# Patient Record
Sex: FEMALE | Race: WHITE
[De-identification: names, ages, dates, MRNs, and addresses within clinical notes are randomized per-mention and may not be internally consistent; named-entity substitution may affect disease eponyms.]

---

## 2019-07-10 ENCOUNTER — HOSPITAL ENCOUNTER (INPATIENT)
Dept: HOSPITAL 12 - ER | Age: 79
LOS: 8 days | Discharge: NURSING, REHAB OR LONG-TERM CARE | DRG: 885 | End: 2019-07-18
Payer: MEDICARE

## 2019-07-10 VITALS — SYSTOLIC BLOOD PRESSURE: 147 MMHG | DIASTOLIC BLOOD PRESSURE: 48 MMHG

## 2019-07-10 VITALS — SYSTOLIC BLOOD PRESSURE: 147 MMHG | DIASTOLIC BLOOD PRESSURE: 56 MMHG

## 2019-07-10 VITALS — HEIGHT: 64 IN | BODY MASS INDEX: 21.85 KG/M2 | WEIGHT: 128 LBS

## 2019-07-10 DIAGNOSIS — E11.9: ICD-10-CM

## 2019-07-10 DIAGNOSIS — Z79.4: ICD-10-CM

## 2019-07-10 DIAGNOSIS — E03.9: ICD-10-CM

## 2019-07-10 DIAGNOSIS — F33.2: Primary | ICD-10-CM

## 2019-07-10 DIAGNOSIS — L97.919: ICD-10-CM

## 2019-07-10 DIAGNOSIS — J69.0: ICD-10-CM

## 2019-07-10 DIAGNOSIS — Z91.5: ICD-10-CM

## 2019-07-10 DIAGNOSIS — T42.4X2D: ICD-10-CM

## 2019-07-10 DIAGNOSIS — F41.9: ICD-10-CM

## 2019-07-10 DIAGNOSIS — I67.2: ICD-10-CM

## 2019-07-10 DIAGNOSIS — T40.2X2D: ICD-10-CM

## 2019-07-10 DIAGNOSIS — I83.009: ICD-10-CM

## 2019-07-10 DIAGNOSIS — N39.0: ICD-10-CM

## 2019-07-10 DIAGNOSIS — L97.929: ICD-10-CM

## 2019-07-10 DIAGNOSIS — E78.5: ICD-10-CM

## 2019-07-10 DIAGNOSIS — I10: ICD-10-CM

## 2019-07-10 PROCEDURE — A4663 DIALYSIS BLOOD PRESSURE CUFF: HCPCS

## 2019-07-10 RX ADMIN — FAMOTIDINE SCH MG: 20 TABLET, FILM COATED ORAL at 21:06

## 2019-07-10 RX ADMIN — Medication SCH EACH: at 17:24

## 2019-07-10 RX ADMIN — Medication SCH EACH: at 21:07

## 2019-07-10 RX ADMIN — LISINOPRIL SCH MG: 20 TABLET ORAL at 21:00

## 2019-07-10 RX ADMIN — Medication SCH MG: at 21:00
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
## 2019-07-10 NOTE — NUR
V/S WERE TAKEN B/P 140/52, PULSE 62; RESPIRATION 18, SPO2 95%. PATIENT A/O, NO CHANGES IN 
LOC. HOWEVER; SHE REFUSED METOPROLOL AND LISINOPRIL QHS. WILL CONTINUE TO MONITOR.

## 2019-07-11 VITALS — SYSTOLIC BLOOD PRESSURE: 144 MMHG | DIASTOLIC BLOOD PRESSURE: 47 MMHG

## 2019-07-11 VITALS — DIASTOLIC BLOOD PRESSURE: 60 MMHG | SYSTOLIC BLOOD PRESSURE: 157 MMHG

## 2019-07-11 VITALS — DIASTOLIC BLOOD PRESSURE: 42 MMHG | SYSTOLIC BLOOD PRESSURE: 145 MMHG

## 2019-07-11 LAB
ALP SERPL-CCNC: 61 U/L (ref 50–136)
ALT SERPL W/O P-5'-P-CCNC: 48 U/L (ref 14–59)
AST SERPL-CCNC: 25 U/L (ref 15–37)
BILIRUB SERPL-MCNC: 0.7 MG/DL (ref 0.2–1)
BUN SERPL-MCNC: 27 MG/DL (ref 7–18)
CHLORIDE SERPL-SCNC: 106 MMOL/L (ref 98–107)
CHOLEST SERPL-MCNC: 146 MG/DL (ref ?–200)
CO2 SERPL-SCNC: 28 MMOL/L (ref 21–32)
CREAT SERPL-MCNC: 0.9 MG/DL (ref 0.6–1.3)
GLUCOSE SERPL-MCNC: 121 MG/DL (ref 74–106)
HDLC SERPL-MCNC: 50 MG/DL (ref 40–60)
POTASSIUM SERPL-SCNC: 3.6 MMOL/L (ref 3.5–5.1)
TRIGL SERPL-MCNC: 131 MG/DL (ref 30–150)
WS STN SPEC: 7 G/DL (ref 6.4–8.2)

## 2019-07-11 RX ADMIN — Medication SCH MG: at 08:29

## 2019-07-11 RX ADMIN — AMLODIPINE BESYLATE SCH MG: 10 TABLET ORAL at 08:30

## 2019-07-11 RX ADMIN — FOLIC ACID SCH MG: 1 TABLET ORAL at 08:31

## 2019-07-11 RX ADMIN — Medication SCH MG: at 08:31

## 2019-07-11 RX ADMIN — LISINOPRIL SCH MG: 20 TABLET ORAL at 20:32

## 2019-07-11 RX ADMIN — Medication SCH MG: at 20:32

## 2019-07-11 RX ADMIN — Medication SCH EACH: at 20:31

## 2019-07-11 RX ADMIN — Medication SCH EACH: at 11:39

## 2019-07-11 RX ADMIN — FAMOTIDINE SCH MG: 20 TABLET, FILM COATED ORAL at 20:31

## 2019-07-11 RX ADMIN — Medication SCH EACH: at 16:53

## 2019-07-11 RX ADMIN — Medication SCH EACH: at 06:51

## 2019-07-11 RX ADMIN — FAMOTIDINE SCH MG: 20 TABLET, FILM COATED ORAL at 08:29

## 2019-07-11 RX ADMIN — LEVOTHYROXINE SODIUM SCH MCG: 100 TABLET ORAL at 06:32

## 2019-07-11 RX ADMIN — ESCITALOPRAM OXALATE SCH MG: 10 TABLET, FILM COATED ORAL at 08:30

## 2019-07-11 RX ADMIN — LISINOPRIL SCH MG: 20 TABLET ORAL at 08:30

## 2019-07-11 RX ADMIN — TEMAZEPAM PRN MG: 7.5 CAPSULE ORAL at 23:26

## 2019-07-11 NOTE — NUR
IT AS NOTED A BUMP OF APPROX 6CM X 6CM ON PATIENT'S RIGHT SIDE OF HER LOWER BACK. NO 
SWELLING, REDNESS NOTED, SKIN IS INTACT. IT APPEARS TO BE SOME TYPE OF IMPLANT. WILL CHECKS 
MEDICAL RECORDS. WILL CONTINUE TO MONITOR.
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
## 2019-07-11 NOTE — NUR
Pt received this morning, AOx1, to name only, denies pain, SI/HI at this time, and is able 
to CFS. Pt is calm and compliant, confused continuing to reference being in a plane. 
Re-orientation provided. Sister came to visit and discussed d/c f/u plans with social 
worker. All safety and comfort precautions in place. Will continue to monitor.

## 2019-07-12 VITALS — SYSTOLIC BLOOD PRESSURE: 131 MMHG | DIASTOLIC BLOOD PRESSURE: 50 MMHG

## 2019-07-12 VITALS — SYSTOLIC BLOOD PRESSURE: 130 MMHG | DIASTOLIC BLOOD PRESSURE: 69 MMHG

## 2019-07-12 VITALS — SYSTOLIC BLOOD PRESSURE: 124 MMHG | DIASTOLIC BLOOD PRESSURE: 51 MMHG

## 2019-07-12 LAB
BASOPHILS # BLD AUTO: 0.1 K/UL (ref 0–8)
BASOPHILS NFR BLD AUTO: 0.7 % (ref 0–2)
BUN SERPL-MCNC: 22 MG/DL (ref 7–18)
CHLORIDE SERPL-SCNC: 107 MMOL/L (ref 98–107)
CO2 SERPL-SCNC: 27 MMOL/L (ref 21–32)
CREAT SERPL-MCNC: 0.8 MG/DL (ref 0.6–1.3)
EOSINOPHIL # BLD AUTO: 0.1 K/UL (ref 0–0.7)
EOSINOPHIL NFR BLD AUTO: 1.1 % (ref 0–7)
GLUCOSE SERPL-MCNC: 111 MG/DL (ref 74–106)
HCT VFR BLD AUTO: 37.4 % (ref 31.2–41.9)
HGB BLD-MCNC: 12.5 G/DL (ref 10.9–14.3)
LYMPHOCYTES # BLD AUTO: 1.4 K/UL (ref 20–40)
LYMPHOCYTES NFR BLD AUTO: 18.3 % (ref 20.5–51.5)
MCH RBC QN AUTO: 30.6 UUG (ref 24.7–32.8)
MCHC RBC AUTO-ENTMCNC: 33 G/DL (ref 32.3–35.6)
MCV RBC AUTO: 91.9 FL (ref 75.5–95.3)
MONOCYTES # BLD AUTO: 1 K/UL (ref 2–10)
MONOCYTES NFR BLD AUTO: 12.7 % (ref 0–11)
NEUTROPHILS # BLD AUTO: 5.2 K/UL (ref 1.8–8.9)
NEUTROPHILS NFR BLD AUTO: 67.2 % (ref 38.5–71.5)
PLATELET # BLD AUTO: 283 K/UL (ref 179–408)
POTASSIUM SERPL-SCNC: 3.1 MMOL/L (ref 3.5–5.1)
RBC # BLD AUTO: 4.07 MIL/UL (ref 3.63–4.92)
TSH SERPL DL<=0.005 MIU/L-ACNC: 1.31 MIU/ML (ref 0.36–3.74)
WBC # BLD AUTO: 7.7 K/UL (ref 3.8–11.8)

## 2019-07-12 RX ADMIN — Medication SCH MG: at 20:21

## 2019-07-12 RX ADMIN — BACITRACIN, NEOMYCIN, POLYMYXIN B SCH GM: 400; 3.5; 5 OINTMENT TOPICAL at 14:10

## 2019-07-12 RX ADMIN — CLOTRIMAZOLE SCH GM: 1 CREAM TOPICAL at 16:23

## 2019-07-12 RX ADMIN — ANORECTAL OINTMENT SCH GM: 15.7; .44; 24; 20.6 OINTMENT TOPICAL at 20:22

## 2019-07-12 RX ADMIN — Medication SCH MEQ: at 12:00

## 2019-07-12 RX ADMIN — ANORECTAL OINTMENT SCH GM: 15.7; .44; 24; 20.6 OINTMENT TOPICAL at 14:12

## 2019-07-12 RX ADMIN — FAMOTIDINE SCH MG: 20 TABLET, FILM COATED ORAL at 20:20

## 2019-07-12 RX ADMIN — LEVOTHYROXINE SODIUM SCH MCG: 100 TABLET ORAL at 06:50

## 2019-07-12 RX ADMIN — LISINOPRIL SCH MG: 20 TABLET ORAL at 20:22

## 2019-07-12 RX ADMIN — ESCITALOPRAM OXALATE SCH MG: 10 TABLET, FILM COATED ORAL at 08:09

## 2019-07-12 RX ADMIN — FOLIC ACID SCH MG: 1 TABLET ORAL at 08:09

## 2019-07-12 RX ADMIN — Medication SCH EACH: at 07:04

## 2019-07-12 RX ADMIN — FAMOTIDINE SCH MG: 20 TABLET, FILM COATED ORAL at 08:09

## 2019-07-12 RX ADMIN — LISINOPRIL SCH MG: 20 TABLET ORAL at 08:10

## 2019-07-12 RX ADMIN — Medication SCH MEQ: at 14:17

## 2019-07-12 RX ADMIN — Medication SCH MG: at 08:09

## 2019-07-12 RX ADMIN — Medication SCH MG: at 08:10

## 2019-07-12 RX ADMIN — AMLODIPINE BESYLATE SCH MG: 10 TABLET ORAL at 08:10
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
## 2019-07-12 NOTE — NUR
Initial Discharge Planning: 

Patient is currently living at home on her own [[address] [place] [address]; Phone: [phone]]. Patient unable to discuss a

potential discharge plan due to altered mental status. Per patient's

sister, Crystal Fitzgerald [[phone]], patient will likely need assisting

securing placement.  will continue to collaborate with

patient, family, and MD regarding a safe and proper discharge plan.

## 2019-07-12 NOTE — NUR
GROUP NOTE: SW prompted pt to attend group discussing the topic of social supports, but pt 
was sleeping and not easily aroused.

## 2019-07-13 VITALS — DIASTOLIC BLOOD PRESSURE: 66 MMHG | SYSTOLIC BLOOD PRESSURE: 157 MMHG

## 2019-07-13 VITALS — DIASTOLIC BLOOD PRESSURE: 59 MMHG | SYSTOLIC BLOOD PRESSURE: 134 MMHG

## 2019-07-13 VITALS — SYSTOLIC BLOOD PRESSURE: 126 MMHG | DIASTOLIC BLOOD PRESSURE: 50 MMHG

## 2019-07-13 LAB
APPEARANCE UR: (no result)
BILIRUB UR QL STRIP: NEGATIVE
COLOR UR: YELLOW
DEPRECATED SQUAMOUS URNS QL MICRO: (no result) /HPF
GLUCOSE UR STRIP-MCNC: NEGATIVE MG/DL
HGB UR QL STRIP: (no result)
KETONES UR STRIP-MCNC: NEGATIVE MG/DL
LEUKOCYTE ESTERASE UR QL STRIP: (no result)
MUCOUS THREADS URNS QL MICRO: (no result) /LPF
NITRITE UR QL STRIP: NEGATIVE
PH UR STRIP: 5.5 [PH] (ref 5–8)
RBC #/AREA URNS HPF: (no result) /HPF (ref 0–3)
SP GR UR STRIP: 1.02 (ref 1–1.03)
UROBILINOGEN UR STRIP-MCNC: 0.2 E.U./DL
WBC #/AREA URNS HPF: (no result) /HPF

## 2019-07-13 RX ADMIN — FAMOTIDINE SCH MG: 20 TABLET, FILM COATED ORAL at 20:06

## 2019-07-13 RX ADMIN — LISINOPRIL SCH MG: 20 TABLET ORAL at 20:06

## 2019-07-13 RX ADMIN — ESCITALOPRAM OXALATE SCH MG: 10 TABLET, FILM COATED ORAL at 08:45

## 2019-07-13 RX ADMIN — BACITRACIN, NEOMYCIN, POLYMYXIN B SCH GM: 400; 3.5; 5 OINTMENT TOPICAL at 08:50

## 2019-07-13 RX ADMIN — ANORECTAL OINTMENT SCH APP: 15.7; .44; 24; 20.6 OINTMENT TOPICAL at 20:07

## 2019-07-13 RX ADMIN — ANORECTAL OINTMENT SCH GM: 15.7; .44; 24; 20.6 OINTMENT TOPICAL at 08:49

## 2019-07-13 RX ADMIN — AMLODIPINE BESYLATE SCH MG: 10 TABLET ORAL at 09:00

## 2019-07-13 RX ADMIN — LEVOTHYROXINE SODIUM SCH MCG: 100 TABLET ORAL at 06:01

## 2019-07-13 RX ADMIN — FAMOTIDINE SCH MG: 20 TABLET, FILM COATED ORAL at 08:47

## 2019-07-13 RX ADMIN — ACETAMINOPHEN PRN MG: 325 TABLET ORAL at 08:48

## 2019-07-13 RX ADMIN — Medication SCH MG: at 08:47

## 2019-07-13 RX ADMIN — CLOTRIMAZOLE SCH GM: 1 CREAM TOPICAL at 08:49

## 2019-07-13 RX ADMIN — LISINOPRIL SCH MG: 20 TABLET ORAL at 09:00

## 2019-07-13 RX ADMIN — ACETAMINOPHEN PRN MG: 325 TABLET ORAL at 17:09

## 2019-07-13 RX ADMIN — FOLIC ACID SCH MG: 1 TABLET ORAL at 08:47

## 2019-07-13 RX ADMIN — CLOTRIMAZOLE SCH APPLIC: 1 CREAM TOPICAL at 16:09

## 2019-07-13 RX ADMIN — Medication SCH MG: at 09:00

## 2019-07-13 RX ADMIN — SODIUM HYPOCHLORITE SCH ML: 1.25 SOLUTION TOPICAL at 09:06

## 2019-07-13 RX ADMIN — Medication SCH MG: at 20:06

## 2019-07-13 RX ADMIN — Medication SCH EACH: at 06:31

## 2019-07-13 NOTE — NUR
GPS: Remain confused but cooperative. no agitation noted at this time. slept 4 hrs through 
the night. resting in bed comfortably. assisted with adl's. continue plan of care.

## 2019-07-13 NOTE — NUR
Patient currently resting in bed comfortably.  Compliant and cooperative with care and 
medications.  Wound  treatment completed.  No aggressive behavior noted throughout my shift. 
Patient appears to be confused at times.  Denies SI and HI at this time.  Complained of 
headache and BLE pain.  PRN Tylenol administered x2.  Continue plan of care.

## 2019-07-14 VITALS — DIASTOLIC BLOOD PRESSURE: 53 MMHG | SYSTOLIC BLOOD PRESSURE: 158 MMHG

## 2019-07-14 VITALS — SYSTOLIC BLOOD PRESSURE: 128 MMHG | DIASTOLIC BLOOD PRESSURE: 55 MMHG

## 2019-07-14 VITALS — DIASTOLIC BLOOD PRESSURE: 54 MMHG | SYSTOLIC BLOOD PRESSURE: 130 MMHG

## 2019-07-14 LAB
BUN SERPL-MCNC: 16 MG/DL (ref 7–18)
CHLORIDE SERPL-SCNC: 109 MMOL/L (ref 98–107)
CO2 SERPL-SCNC: 23 MMOL/L (ref 21–32)
CREAT SERPL-MCNC: 0.8 MG/DL (ref 0.6–1.3)
GLUCOSE SERPL-MCNC: 89 MG/DL (ref 74–106)
MAGNESIUM SERPL-MCNC: 2 MG/DL (ref 1.8–2.4)
PHOSPHATE SERPL-MCNC: 3.1 MG/DL (ref 2.5–4.9)
POTASSIUM SERPL-SCNC: 3.6 MMOL/L (ref 3.5–5.1)

## 2019-07-14 RX ADMIN — BACITRACIN, NEOMYCIN, POLYMYXIN B SCH GM: 400; 3.5; 5 OINTMENT TOPICAL at 08:21

## 2019-07-14 RX ADMIN — ANORECTAL OINTMENT SCH APP: 15.7; .44; 24; 20.6 OINTMENT TOPICAL at 08:20

## 2019-07-14 RX ADMIN — ANORECTAL OINTMENT SCH APP: 15.7; .44; 24; 20.6 OINTMENT TOPICAL at 20:19

## 2019-07-14 RX ADMIN — FAMOTIDINE SCH MG: 20 TABLET, FILM COATED ORAL at 20:18

## 2019-07-14 RX ADMIN — LEVOTHYROXINE SODIUM SCH MCG: 100 TABLET ORAL at 06:12

## 2019-07-14 RX ADMIN — FAMOTIDINE SCH MG: 20 TABLET, FILM COATED ORAL at 08:16

## 2019-07-14 RX ADMIN — LISINOPRIL SCH MG: 20 TABLET ORAL at 08:18

## 2019-07-14 RX ADMIN — Medication SCH MG: at 08:18

## 2019-07-14 RX ADMIN — Medication SCH MG: at 08:17

## 2019-07-14 RX ADMIN — SODIUM HYPOCHLORITE SCH ML: 1.25 SOLUTION TOPICAL at 08:20

## 2019-07-14 RX ADMIN — LISINOPRIL SCH MG: 20 TABLET ORAL at 20:18

## 2019-07-14 RX ADMIN — ACETAMINOPHEN PRN MG: 325 TABLET ORAL at 14:48

## 2019-07-14 RX ADMIN — ESCITALOPRAM OXALATE SCH MG: 10 TABLET, FILM COATED ORAL at 08:17

## 2019-07-14 RX ADMIN — TEMAZEPAM PRN MG: 7.5 CAPSULE ORAL at 23:08

## 2019-07-14 RX ADMIN — AMLODIPINE BESYLATE SCH MG: 10 TABLET ORAL at 08:17

## 2019-07-14 RX ADMIN — CLOTRIMAZOLE SCH APPLIC: 1 CREAM TOPICAL at 08:21

## 2019-07-14 RX ADMIN — Medication SCH MG: at 20:18

## 2019-07-14 RX ADMIN — CLOTRIMAZOLE SCH APPLIC: 1 CREAM TOPICAL at 16:54

## 2019-07-14 RX ADMIN — Medication SCH EACH: at 06:17

## 2019-07-14 RX ADMIN — FOLIC ACID SCH MG: 1 TABLET ORAL at 08:18

## 2019-07-14 NOTE — NUR
GPS: Remain confused but cooperative with meds and care. no agitation noted at this time. 
slept 7 hrs through the night. resting in bed comfortably. assisted with adl's. continue 
plan of care.

## 2019-07-15 VITALS — SYSTOLIC BLOOD PRESSURE: 139 MMHG | DIASTOLIC BLOOD PRESSURE: 44 MMHG

## 2019-07-15 VITALS — SYSTOLIC BLOOD PRESSURE: 146 MMHG | DIASTOLIC BLOOD PRESSURE: 45 MMHG

## 2019-07-15 VITALS — SYSTOLIC BLOOD PRESSURE: 129 MMHG | DIASTOLIC BLOOD PRESSURE: 50 MMHG

## 2019-07-15 RX ADMIN — CLOTRIMAZOLE SCH APPLIC: 1 CREAM TOPICAL at 16:56

## 2019-07-15 RX ADMIN — Medication SCH MG: at 08:29

## 2019-07-15 RX ADMIN — ACETAMINOPHEN PRN MG: 325 TABLET ORAL at 04:35

## 2019-07-15 RX ADMIN — FOLIC ACID SCH MG: 1 TABLET ORAL at 08:28

## 2019-07-15 RX ADMIN — TEMAZEPAM PRN MG: 7.5 CAPSULE ORAL at 22:17

## 2019-07-15 RX ADMIN — ANORECTAL OINTMENT SCH APP: 15.7; .44; 24; 20.6 OINTMENT TOPICAL at 20:13

## 2019-07-15 RX ADMIN — ALUMINUM HYDROXIDE, MAGNESIUM HYDROXIDE, AND SIMETHICONE PRN ML: 200; 200; 20 SUSPENSION ORAL at 11:19

## 2019-07-15 RX ADMIN — FAMOTIDINE SCH MG: 20 TABLET, FILM COATED ORAL at 08:29

## 2019-07-15 RX ADMIN — Medication SCH MG: at 20:12

## 2019-07-15 RX ADMIN — FAMOTIDINE SCH MG: 20 TABLET, FILM COATED ORAL at 20:11

## 2019-07-15 RX ADMIN — ANORECTAL OINTMENT SCH APP: 15.7; .44; 24; 20.6 OINTMENT TOPICAL at 08:27

## 2019-07-15 RX ADMIN — LEVOTHYROXINE SODIUM SCH MCG: 100 TABLET ORAL at 06:05

## 2019-07-15 RX ADMIN — LISINOPRIL SCH MG: 20 TABLET ORAL at 08:29

## 2019-07-15 RX ADMIN — LISINOPRIL SCH MG: 20 TABLET ORAL at 20:12

## 2019-07-15 RX ADMIN — Medication SCH MG: at 08:28

## 2019-07-15 RX ADMIN — ESCITALOPRAM OXALATE SCH MG: 10 TABLET, FILM COATED ORAL at 08:29

## 2019-07-15 RX ADMIN — SODIUM HYPOCHLORITE SCH ML: 1.25 SOLUTION TOPICAL at 08:27

## 2019-07-15 RX ADMIN — AMLODIPINE BESYLATE SCH MG: 10 TABLET ORAL at 08:28

## 2019-07-15 RX ADMIN — Medication SCH EACH: at 06:38

## 2019-07-15 RX ADMIN — CLOTRIMAZOLE SCH APPLIC: 1 CREAM TOPICAL at 08:27

## 2019-07-15 RX ADMIN — BACITRACIN, NEOMYCIN, POLYMYXIN B SCH GM: 400; 3.5; 5 OINTMENT TOPICAL at 08:27

## 2019-07-15 NOTE — NUR
called Dr. Oakley's office per family requested due to spinal stimulating  implant ,per 
office nurse will relay to Dr. Oalkey and will contact patient's sister .

## 2019-07-15 NOTE — NUR
Social Work Note:

Received phone call from patient's sister, Shanae, [phone]. She expressed concern about 
patient's confusion. This was relayed to Dr Varma who agreed to call Natasha. This clinician 
read patient's current medication to her. Shanae went to patient's apartment today to clean 
up. She is concerned about a spinal chord stimulator inserted by Dr Parag Oakley 
[phone] on patient's lower mid-back. She wants nurses to call Dr Oakley about this. 
This information was relayed to Travis, patient's nurse at 0651.



Shanae also wants Dr Varma to call her. Dr Varma agreed to call  today.

## 2019-07-16 VITALS — SYSTOLIC BLOOD PRESSURE: 119 MMHG | DIASTOLIC BLOOD PRESSURE: 45 MMHG

## 2019-07-16 VITALS — SYSTOLIC BLOOD PRESSURE: 140 MMHG | DIASTOLIC BLOOD PRESSURE: 50 MMHG

## 2019-07-16 VITALS — DIASTOLIC BLOOD PRESSURE: 36 MMHG | SYSTOLIC BLOOD PRESSURE: 127 MMHG

## 2019-07-16 VITALS — DIASTOLIC BLOOD PRESSURE: 51 MMHG | SYSTOLIC BLOOD PRESSURE: 112 MMHG

## 2019-07-16 RX ADMIN — LEVOTHYROXINE SODIUM SCH MCG: 100 TABLET ORAL at 06:06

## 2019-07-16 RX ADMIN — Medication SCH MG: at 08:24

## 2019-07-16 RX ADMIN — Medication SCH MG: at 21:00

## 2019-07-16 RX ADMIN — LISINOPRIL SCH MG: 20 TABLET ORAL at 08:23

## 2019-07-16 RX ADMIN — AMLODIPINE BESYLATE SCH MG: 10 TABLET ORAL at 08:23

## 2019-07-16 RX ADMIN — SODIUM HYPOCHLORITE SCH ML: 1.25 SOLUTION TOPICAL at 08:31

## 2019-07-16 RX ADMIN — Medication SCH EACH: at 06:31

## 2019-07-16 RX ADMIN — ACETAMINOPHEN PRN MG: 325 TABLET ORAL at 20:36

## 2019-07-16 RX ADMIN — ESCITALOPRAM OXALATE SCH MG: 10 TABLET, FILM COATED ORAL at 08:22

## 2019-07-16 RX ADMIN — Medication SCH MG: at 08:22

## 2019-07-16 RX ADMIN — ANORECTAL OINTMENT SCH APP: 15.7; .44; 24; 20.6 OINTMENT TOPICAL at 08:32

## 2019-07-16 RX ADMIN — FAMOTIDINE SCH MG: 20 TABLET, FILM COATED ORAL at 08:24

## 2019-07-16 RX ADMIN — CLOTRIMAZOLE SCH APPLIC: 1 CREAM TOPICAL at 16:28

## 2019-07-16 RX ADMIN — TEMAZEPAM PRN MG: 7.5 CAPSULE ORAL at 22:44

## 2019-07-16 RX ADMIN — ANORECTAL OINTMENT SCH APP: 15.7; .44; 24; 20.6 OINTMENT TOPICAL at 21:36

## 2019-07-16 RX ADMIN — BACITRACIN, NEOMYCIN, POLYMYXIN B SCH GM: 400; 3.5; 5 OINTMENT TOPICAL at 08:33

## 2019-07-16 RX ADMIN — LISINOPRIL SCH MG: 20 TABLET ORAL at 21:00

## 2019-07-16 RX ADMIN — CLOTRIMAZOLE SCH APPLIC: 1 CREAM TOPICAL at 08:24

## 2019-07-16 RX ADMIN — FAMOTIDINE SCH MG: 20 TABLET, FILM COATED ORAL at 20:31

## 2019-07-16 RX ADMIN — FOLIC ACID SCH MG: 1 TABLET ORAL at 08:23

## 2019-07-16 NOTE — NUR
RECEIVED PATIENT IN HER ROOM IN BED. SHE IS NOTED AWAKE A/O X 3. SHE IS ABLE TO AMBULATE 
WITH STEADY GAIT AND ABLE TO MAKE HER NEEDS KNOWN. PATIENT NOTED WITH FAIR INSIGHT INTO HER 
ADMISSION TO [facility]. SHE IS ABLE TO RECALL THE EVENT TO LED TO HER ADMISSION TO [facility]. SHE IS 
ABLE TO VERBALIZED FEELINGS. DENIES SI ABLE TO CFS. V/S STABLE AT THIS TIME. SAFETY AND FALL 
PRECAUTION IN PLACE. PT IS REASSURED FOR HER SAFETY. WILL CONTINUE TO MONITOR,

## 2019-07-17 VITALS — SYSTOLIC BLOOD PRESSURE: 121 MMHG | DIASTOLIC BLOOD PRESSURE: 46 MMHG

## 2019-07-17 VITALS — DIASTOLIC BLOOD PRESSURE: 54 MMHG | SYSTOLIC BLOOD PRESSURE: 136 MMHG

## 2019-07-17 VITALS — SYSTOLIC BLOOD PRESSURE: 123 MMHG | DIASTOLIC BLOOD PRESSURE: 50 MMHG

## 2019-07-17 RX ADMIN — AMLODIPINE BESYLATE SCH MG: 10 TABLET ORAL at 09:00

## 2019-07-17 RX ADMIN — ALUMINUM HYDROXIDE, MAGNESIUM HYDROXIDE, AND SIMETHICONE PRN ML: 200; 200; 20 SUSPENSION ORAL at 18:06

## 2019-07-17 RX ADMIN — ACETAMINOPHEN PRN MG: 325 TABLET ORAL at 23:20

## 2019-07-17 RX ADMIN — Medication SCH MG: at 20:04

## 2019-07-17 RX ADMIN — BACITRACIN, NEOMYCIN, POLYMYXIN B SCH GM: 400; 3.5; 5 OINTMENT TOPICAL at 09:39

## 2019-07-17 RX ADMIN — LISINOPRIL SCH MG: 20 TABLET ORAL at 20:04

## 2019-07-17 RX ADMIN — FAMOTIDINE SCH MG: 20 TABLET, FILM COATED ORAL at 20:04

## 2019-07-17 RX ADMIN — Medication SCH MG: at 09:00

## 2019-07-17 RX ADMIN — ANORECTAL OINTMENT SCH APPLIC: 15.7; .44; 24; 20.6 OINTMENT TOPICAL at 20:05

## 2019-07-17 RX ADMIN — TEMAZEPAM PRN MG: 7.5 CAPSULE ORAL at 20:55

## 2019-07-17 RX ADMIN — CLOTRIMAZOLE SCH APPLIC: 1 CREAM TOPICAL at 09:40

## 2019-07-17 RX ADMIN — ESCITALOPRAM OXALATE SCH MG: 10 TABLET, FILM COATED ORAL at 09:34

## 2019-07-17 RX ADMIN — ANORECTAL OINTMENT SCH APP: 15.7; .44; 24; 20.6 OINTMENT TOPICAL at 09:39

## 2019-07-17 RX ADMIN — Medication SCH MG: at 09:39

## 2019-07-17 RX ADMIN — ACETAMINOPHEN PRN MG: 325 TABLET ORAL at 10:27

## 2019-07-17 RX ADMIN — CLOTRIMAZOLE SCH APPLIC: 1 CREAM TOPICAL at 18:21

## 2019-07-17 RX ADMIN — LISINOPRIL SCH MG: 20 TABLET ORAL at 09:00

## 2019-07-17 RX ADMIN — SODIUM HYPOCHLORITE SCH ML: 1.25 SOLUTION TOPICAL at 09:40

## 2019-07-17 RX ADMIN — FAMOTIDINE SCH MG: 20 TABLET, FILM COATED ORAL at 09:33

## 2019-07-17 RX ADMIN — Medication SCH EACH: at 06:56

## 2019-07-17 RX ADMIN — FOLIC ACID SCH MG: 1 TABLET ORAL at 09:33

## 2019-07-17 RX ADMIN — LEVOTHYROXINE SODIUM SCH MCG: 100 TABLET ORAL at 06:35

## 2019-07-17 NOTE — NUR
Received pt resting in bed. AAO x3. No acute distress noted. Denies pain/ discomfort. Denies 
SI/HI/AH/VH. Able to CFS. Pt appears pleasant, cooperative, and compliant with care. Due 
meds given as ordered. Restoril given as per pt request. Safety measures maintained. Will 
continue to monitor.

## 2019-07-18 VITALS — SYSTOLIC BLOOD PRESSURE: 122 MMHG | DIASTOLIC BLOOD PRESSURE: 41 MMHG

## 2019-07-18 VITALS — DIASTOLIC BLOOD PRESSURE: 41 MMHG | SYSTOLIC BLOOD PRESSURE: 122 MMHG

## 2019-07-18 RX ADMIN — CLOTRIMAZOLE SCH APPLIC: 1 CREAM TOPICAL at 08:51

## 2019-07-18 RX ADMIN — LISINOPRIL SCH MG: 20 TABLET ORAL at 08:50

## 2019-07-18 RX ADMIN — Medication SCH MG: at 08:48

## 2019-07-18 RX ADMIN — BACITRACIN, NEOMYCIN, POLYMYXIN B SCH GM: 400; 3.5; 5 OINTMENT TOPICAL at 08:51

## 2019-07-18 RX ADMIN — Medication SCH MG: at 08:50

## 2019-07-18 RX ADMIN — FOLIC ACID SCH MG: 1 TABLET ORAL at 13:11

## 2019-07-18 RX ADMIN — ESCITALOPRAM OXALATE SCH MG: 10 TABLET, FILM COATED ORAL at 08:48

## 2019-07-18 RX ADMIN — Medication SCH EACH: at 06:42

## 2019-07-18 RX ADMIN — AMLODIPINE BESYLATE SCH MG: 10 TABLET ORAL at 08:50

## 2019-07-18 RX ADMIN — LEVOTHYROXINE SODIUM SCH MCG: 100 TABLET ORAL at 06:34

## 2019-07-18 RX ADMIN — ANORECTAL OINTMENT SCH APPLIC: 15.7; .44; 24; 20.6 OINTMENT TOPICAL at 08:51

## 2019-07-18 RX ADMIN — FAMOTIDINE SCH MG: 20 TABLET, FILM COATED ORAL at 08:48

## 2019-07-18 RX ADMIN — SODIUM HYPOCHLORITE SCH ML: 1.25 SOLUTION TOPICAL at 09:13
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
## 2019-07-18 NOTE — NUR
FIREARMS REPORT: 



 completed and submitted a [employer] firearms report for 5150 DTS certification. A 
copy of report has been placed in patient chart.

## 2019-07-18 NOTE — NUR
Gps/Lvn- Discharged planning in progress, report was given by Jesus Kirby to [facility]. 
All belonging given back to patient. Reviewed discharge instructions, facility verbalized 
understanding. Multiple wounds patient came with, to right elbow, right lateral ankle, left 
lower ext. skin was very dry and tenting, treated as ordered, Continue wound care as 
ordered. Patient was well informed of her dc. plan. Patient in good spirit, ambulatory, sba. 
No complaints noted.

## 2019-11-12 NOTE — NUR
PATIENT AWAKE, RESTLESS. GIVEN RESTORIL 7.5MG PO PRN AS ORDERED FOR SLEEP. BED ALARM ON. 
WILL CONTINUE TO MONITOR AND ASSESS. Refills sent